# Patient Record
(demographics unavailable — no encounter records)

---

## 2024-11-05 NOTE — DISCUSSION/SUMMARY
[de-identified] : After thorough history full examination and review of the x-rays.  It was explained to the patient that she is doing well following her left total hip replacement as the prosthesis is in place and shows great alignment with fixation.  She does still present with significant discomfort anteriorly.  She was given a prescription for a Medrol Dosepak as well as for amoxicillin due to the fact that she is to undergo dental work within the next several days/weeks.  It is advised that she receive an MRI of the left hip.  To further evaluate the prosthesis but also to evaluate the soft tissues particularly the hip flexors/slow psoas muscles.  She is advised to notify the office once the MRI is performed to discuss its findings and further medical management.  In the meantime it is suggested that she continue with this conservative measures including therapy exercises and anti-inflammatories when needed.  35 minutes were spent, face to face, in direct consultation with the patient. This includes reviewing the natural history of their Dx., eliciting the history, performing an orthopedic exam, review of the x-ray findings, forming a differential Dx and discussing all treatment options. This Includes both surgical and non-surgical treatments. I also reviewed all the risks and benefits of non-operative & operative Tx options, future impact into orthopedic functions/problems, activity restrictions both at home and at work, and all follow up requirements.

## 2024-11-05 NOTE — HISTORY OF PRESENT ILLNESS
[de-identified] : Patient is a 53-year-old female who presents today for an evaluation for her left hip.  She is status post left total hip replacement from the anterior approach by Dr. Erickson in May 16, 2018.  She states that she did very well following surgery with no complaints of any pain or discomfort.  And did return back to her previous activities.  However earlier this year in May.  She states that after a day of walking which was nothing new to her.  She felt a significant pain in her left hip while sleeping.  She states that she was laying down when she thought she felt someone tugged on her leg and felt a significant sharp pain.  She was unable to weight-bear for a period of time and to take pain medications.  This did not improve within the next day or 2 and she was able to return back to her prior activities.  However since then this has happened 3 other times with a total of 4 episodes of significant quick discomforts which was relieved shortly afterwards.  Currently she states that the pain can be up to a 4 or 5 depending on her activities.  And that the pain is mostly within the groin.  She denies any specific injury or accident.  Presents today for an evaluation regarding her left hip.

## 2024-11-05 NOTE — PHYSICAL EXAM
[de-identified] : On physical examination of the left hip.  Patient is status post left total hip replacement from the anterior approach.  There is a well-healed surgical scar with no evidence of infection superficial or deep.  There is no redness swelling heat discharge fever or any other complaints of significant discomfort.  She has excellent range of motion both passive and actively.  However with active hip flexion and resistance elicits a marked increase in her discomfort.  Otherwise the patient does have a painless range of motion.  There is no palpable defect noted.  Neurovascularly she is intact with no evidence of limb length discrepancy.  No evidence of any sensory deficit.  Patient has good distal pulses no calf tenderness. [de-identified] : X-rays of the left hip were taken today. This include aa AP pelvis and lateral hip. They reveal a status post left total hip replacement. The hardware is in place and shows excellent alignment as well as fixation. There is no evidence of limb length discrepancies. There is no evidence of any fracture, dislocation, loosening of the prosthesis.

## 2024-11-05 NOTE — DISCUSSION/SUMMARY
[de-identified] : After thorough history full examination and review of the x-rays.  It was explained to the patient that she is doing well following her left total hip replacement as the prosthesis is in place and shows great alignment with fixation.  She does still present with significant discomfort anteriorly.  She was given a prescription for a Medrol Dosepak as well as for amoxicillin due to the fact that she is to undergo dental work within the next several days/weeks.  It is advised that she receive an MRI of the left hip.  To further evaluate the prosthesis but also to evaluate the soft tissues particularly the hip flexors/slow psoas muscles.  She is advised to notify the office once the MRI is performed to discuss its findings and further medical management.  In the meantime it is suggested that she continue with this conservative measures including therapy exercises and anti-inflammatories when needed.  35 minutes were spent, face to face, in direct consultation with the patient. This includes reviewing the natural history of their Dx., eliciting the history, performing an orthopedic exam, review of the x-ray findings, forming a differential Dx and discussing all treatment options. This Includes both surgical and non-surgical treatments. I also reviewed all the risks and benefits of non-operative & operative Tx options, future impact into orthopedic functions/problems, activity restrictions both at home and at work, and all follow up requirements.

## 2024-11-05 NOTE — PHYSICAL EXAM
[de-identified] : On physical examination of the left hip.  Patient is status post left total hip replacement from the anterior approach.  There is a well-healed surgical scar with no evidence of infection superficial or deep.  There is no redness swelling heat discharge fever or any other complaints of significant discomfort.  She has excellent range of motion both passive and actively.  However with active hip flexion and resistance elicits a marked increase in her discomfort.  Otherwise the patient does have a painless range of motion.  There is no palpable defect noted.  Neurovascularly she is intact with no evidence of limb length discrepancy.  No evidence of any sensory deficit.  Patient has good distal pulses no calf tenderness. [de-identified] : X-rays of the left hip were taken today. This include aa AP pelvis and lateral hip. They reveal a status post left total hip replacement. The hardware is in place and shows excellent alignment as well as fixation. There is no evidence of limb length discrepancies. There is no evidence of any fracture, dislocation, loosening of the prosthesis.

## 2024-11-05 NOTE — HISTORY OF PRESENT ILLNESS
[de-identified] : Patient is a 53-year-old female who presents today for an evaluation for her left hip.  She is status post left total hip replacement from the anterior approach by Dr. Erickson in May 16, 2018.  She states that she did very well following surgery with no complaints of any pain or discomfort.  And did return back to her previous activities.  However earlier this year in May.  She states that after a day of walking which was nothing new to her.  She felt a significant pain in her left hip while sleeping.  She states that she was laying down when she thought she felt someone tugged on her leg and felt a significant sharp pain.  She was unable to weight-bear for a period of time and to take pain medications.  This did not improve within the next day or 2 and she was able to return back to her prior activities.  However since then this has happened 3 other times with a total of 4 episodes of significant quick discomforts which was relieved shortly afterwards.  Currently she states that the pain can be up to a 4 or 5 depending on her activities.  And that the pain is mostly within the groin.  She denies any specific injury or accident.  Presents today for an evaluation regarding her left hip.

## 2024-11-11 NOTE — HISTORY OF PRESENT ILLNESS
[8] : 8 [de-identified] : 11/11/2024:  3 months plantar heel pain. no specific injury. seeing dpm who did csi x2 (last 9/13/24) w/o sig relief. going to PT for hip. denies n/t. no prior sig foot probs. +pre dm (a1c 6.1). denies tob. teaching asst [] : no [FreeTextEntry1] : left foot [de-identified] : CSI [de-identified] : MABEL [de-identified] : XR, MRI

## 2024-11-11 NOTE — DATA REVIEWED
[MRI] : MRI [Left] : left [Ankle] : ankle [Foot] : foot [I reviewed the films/CD and additionally noted] : I reviewed the films/CD and additionally noted [FreeTextEntry1] : mild plantar fasciitis -- milka

## 2024-11-11 NOTE — IMAGING
[de-identified] : LLE Inspection of the ankle, foot and lower leg is as follows: no abrasions or lacerations, no swelling, no erythema and no gross deformity Palpation of the ankle, foot and lower leg is as follows: Tenderness at plantar fascia insertion. Range of motion of the ankle, foot and lower leg is as follows: dorsiflexion to 20 degrees, plantar flexion to 40 degrees, inversion to 30 degrees and eversion to 20 degrees. Strength testing of the ankle, foot and lower leg is as follows: Ankle dorsiflexion strength is 5/5, Ankle plantar flexion strength is 5/5, Ankle inversion strength is 5/5 and Ankle eversion strength is 5/5. Special Testing of the ankle, foot and lower leg are as follows: no pain with heel compression. Vascular testing of the ankle, foot and lower leg are as follows: Dorsalis Pedis (DP) Pulse is 2+. Sensation testing of the ankle, foot and lower leg are as follows: Sensation present to light touch in all distributions. [Left] : left foot [There are no fractures, subluxations or dislocations. No significant abnormalities are seen] : There are no fractures, subluxations or dislocations. No significant abnormalities are seen

## 2025-05-19 NOTE — HISTORY OF PRESENT ILLNESS
[FreeTextEntry1] : She is a 53-year-old woman who is seen today for initial visit.  Recently she has a sensation of incomplete bladder emptying.  She has gone frequently to void.  There is no flank pain or dysuria.  She has history of kidney stones and has undergone shockwave lithotripsy in the past.  In April 2025 she went to emergency room for flank pain and CT scan showed a 3 mm left upper ureteral stone.  White blood cell count was 11.9 and creatinine was 0.9.  In May 2025 CT scan from  radiology showed no kidney stones.  She has been treated for UTIs in the past and also her gynecologist gave her medications for Gardnerella vaginitis.  Residual urine today was only about 30 mL.  She also has history of lichen sclerosis and uses clobetasol.

## 2025-05-19 NOTE — REVIEW OF SYSTEMS
[Nosebleeds] : nosebleeds [Heartburn] : heartburn [Date of last menstrual period ____] : date of last menstrual period: [unfilled] [Urine Infection (bladder/kidney)] : bladder/kidney infection [Told you have blood in urine on a urine test] : told blood was present in a urine test [History of kidney stones] : history of kidney stones [Urine retention] : urine retention [Wake up at night to urinate  How many times?  ___] : wakes up to urinate [unfilled] times during the night [Strong urge to urinate] : strong urge to urinate [Bladder pressure] : experiences bladder pressure [Strain or push to urinate] : strain or push to urinate [Slow urine stream] : slow urine stream [Bladder fullness after urinating] : bladder fullness after urinating [Increased pain/discomfort with bladder filling] : increased pain/discomfort with bladder filling [Hot Flashes] : hot flashes [Negative] : Heme/Lymph [FreeTextEntry1] : UTI

## 2025-05-19 NOTE — ASSESSMENT
[FreeTextEntry1] : She empties bladder well.  CT scan images were reviewed.  She seemed to have passed a small ureteral stone.  She has similar episodes in the past as well.  She has had urinary tract infections in the past and urine culture will be sent.  If results show no evidence of infection, then she may consider cystoscopy for further evaluation.  Potential differential diagnosis was discussed  Nathan Graves MD, FACS [facility]  of Urology [address], [address] [place] [address] Tel: [phone] Fax: [phone]

## 2025-05-19 NOTE — PHYSICAL EXAM
[General Appearance - Well Developed] : well developed [General Appearance - Well Nourished] : well nourished [Normal Appearance] : normal appearance [Well Groomed] : well groomed [General Appearance - In No Acute Distress] : no acute distress [Edema] : no peripheral edema [Respiration, Rhythm And Depth] : normal respiratory rhythm and effort [Exaggerated Use Of Accessory Muscles For Inspiration] : no accessory muscle use [Abdomen Soft] : soft [Abdomen Tenderness] : non-tender [Costovertebral Angle Tenderness] : no ~M costovertebral angle tenderness [Normal Station and Gait] : the gait and station were normal for the patient's age [] : no rash [No Focal Deficits] : no focal deficits [Oriented To Time, Place, And Person] : oriented to person, place, and time [Affect] : the affect was normal [Mood] : the mood was normal [No Palpable Adenopathy] : no palpable adenopathy [RN] : RN [FreeTextEntry2] : Shereen